# Patient Record
Sex: MALE | Race: WHITE | NOT HISPANIC OR LATINO | Employment: FULL TIME | URBAN - NONMETROPOLITAN AREA
[De-identification: names, ages, dates, MRNs, and addresses within clinical notes are randomized per-mention and may not be internally consistent; named-entity substitution may affect disease eponyms.]

---

## 2018-11-04 ENCOUNTER — HOSPITAL ENCOUNTER (EMERGENCY)
Facility: HOSPITAL | Age: 48
Discharge: HOME | End: 2018-11-04
Attending: FAMILY MEDICINE | Admitting: FAMILY MEDICINE

## 2018-11-04 VITALS
HEART RATE: 83 BPM | WEIGHT: 252 LBS | HEIGHT: 68 IN | SYSTOLIC BLOOD PRESSURE: 182 MMHG | RESPIRATION RATE: 16 BRPM | OXYGEN SATURATION: 96 % | TEMPERATURE: 98.9 F | BODY MASS INDEX: 38.19 KG/M2 | DIASTOLIC BLOOD PRESSURE: 115 MMHG

## 2018-11-04 DIAGNOSIS — K08.89 DENTALGIA: Primary | ICD-10-CM

## 2018-11-04 PROCEDURE — 99283 EMERGENCY DEPT VISIT LOW MDM: CPT

## 2018-11-04 RX ORDER — AMOXICILLIN AND CLAVULANATE POTASSIUM 875; 125 MG/1; MG/1
1 TABLET, FILM COATED ORAL EVERY 12 HOURS SCHEDULED
Qty: 14 TABLET | Refills: 0 | Status: SHIPPED | OUTPATIENT
Start: 2018-11-04 | End: 2018-11-11

## 2018-11-04 RX ORDER — LIDOCAINE HYDROCHLORIDE 10 MG/ML
2 INJECTION, SOLUTION EPIDURAL; INFILTRATION; INTRACAUDAL; PERINEURAL ONCE
Status: COMPLETED | OUTPATIENT
Start: 2018-11-04 | End: 2018-11-04

## 2018-11-04 RX ORDER — BUPIVACAINE HYDROCHLORIDE 5 MG/ML
1.8 INJECTION, SOLUTION EPIDURAL; INTRACAUDAL ONCE
Status: COMPLETED | OUTPATIENT
Start: 2018-11-04 | End: 2018-11-04

## 2018-11-04 RX ORDER — HYDROCODONE BITARTRATE AND ACETAMINOPHEN 7.5; 325 MG/1; MG/1
1 TABLET ORAL EVERY 4 HOURS PRN
Qty: 12 TABLET | Refills: 0 | Status: SHIPPED | OUTPATIENT
Start: 2018-11-04

## 2018-11-04 RX ORDER — IBUPROFEN 800 MG/1
800 TABLET ORAL
Qty: 21 TABLET | Refills: 0 | Status: SHIPPED | OUTPATIENT
Start: 2018-11-04 | End: 2018-11-11

## 2018-11-04 RX ADMIN — LIDOCAINE HYDROCHLORIDE 5 ML: 20 SOLUTION ORAL; TOPICAL at 02:48

## 2018-11-04 RX ADMIN — LIDOCAINE HYDROCHLORIDE 2 ML: 10 INJECTION, SOLUTION EPIDURAL; INFILTRATION; INTRACAUDAL; PERINEURAL at 02:48

## 2018-11-04 RX ADMIN — BUPIVACAINE HYDROCHLORIDE 1.8 ML: 5 INJECTION, SOLUTION EPIDURAL; INTRACAUDAL; PERINEURAL at 02:48

## 2018-11-04 NOTE — ED PROVIDER NOTES
Subjective   48-year-old white male presents to emergency department with a complaint of dental pain.  He states that he was seen by a dentist several days ago but he was not having swelling and significant pain at that time.  He was started on Pen-Vee K and they recommended a root canal or to have his tooth pulled.  However he elected to not do so.  Fact that he was managing his pain well and he was not swollen.  However, in the last 24 hours that is changed and his pain has increased dramatically.  He is also having fair amount of swelling despite the Pen-Vee K.  He states that he needs something to manage the pain until he can see a dentist tomorrow.  He denies any fever.            Review of Systems   Constitutional: Negative.    HENT: Positive for dental problem.    Respiratory: Negative.    Cardiovascular: Negative.        History reviewed. No pertinent past medical history.    No Known Allergies    History reviewed. No pertinent surgical history.    History reviewed. No pertinent family history.    Social History     Socioeconomic History   • Marital status:      Spouse name: Not on file   • Number of children: Not on file   • Years of education: Not on file   • Highest education level: Not on file           Objective   Physical Exam   HENT:   Mouth/Throat:       The red spot on the diagram is the location of his pain. There is no abscess.   There is some facial swelling affecting the lower portion of his face and lips on the right side.       Dental Procedure  Date/Time: 11/4/2018 2:48 AM  Performed by: KHUSHBU PEREZ  Authorized by: KHUSHBU PEREZ     Consent:     Consent obtained:  Verbal    Consent given by:  Patient    Risks discussed:  Nerve damage, swelling, unsuccessful block, intravascular injection and hematoma    Alternatives discussed:  Alternative treatment and no treatment  Indications:     Indications: dental pain    Location:     Block type:  Inferior alveolar     Laterality:  Right  Procedure details (see MAR for exact dosages):     Topical anesthetic:  Lidocaine gel    Syringe type:  Luer lock syringe    Needle gauge:  25 G    Anesthetic injected:  Bupivacaine 0.25% WITH epi and lidocaine 1% w/o epi    Injection procedure:  Anatomic landmarks identified, anatomic landmarks palpated, introduced needle, negative aspiration for blood and incremental injection  Post-procedure details:     Outcome:  Pain relieved    Patient tolerance of procedure:  Tolerated well, no immediate complications               ED Course  ED Course as of Jan 01 0248   Sun Nov 04, 2018   0246 Patient was placed in room one and evaluated by me.  Of note, he presented during downtime of the EMR which resulted in delays of timely medical care for him.  An inferior alveolar nerve block was performed with resolution of his pain.  At this point is stable for discharge and will follow up with his dentist as an outpatient.  His antibiotic was changed to Augmentin and he was given Norco and ibuprofen for pain management.  [CE]      ED Course User Index  [CE] Kedar Rodriguez,           Labs Reviewed - No data to display  No results found.        MDM      Final diagnoses:   Dentalgia            Kedar Rodriguez DO  11/04/18 0249       Kedar Rodriguez DO  01/01/19 0248

## 2019-04-22 ENCOUNTER — HOSPITAL ENCOUNTER (OUTPATIENT)
Dept: GENERAL RADIOLOGY | Facility: HOSPITAL | Age: 49
Discharge: HOME | End: 2019-04-22
Admitting: ORTHOPAEDIC SURGERY

## 2019-04-22 DIAGNOSIS — M25.561 RIGHT KNEE PAIN, UNSPECIFIED CHRONICITY: ICD-10-CM

## 2019-04-22 PROCEDURE — 73562 X-RAY EXAM OF KNEE 3: CPT

## 2019-08-12 ENCOUNTER — OFFICE VISIT (OUTPATIENT)
Dept: PODIATRY | Facility: CLINIC | Age: 49
End: 2019-08-12

## 2019-08-12 VITALS — OXYGEN SATURATION: 98 % | BODY MASS INDEX: 38.19 KG/M2 | HEART RATE: 82 BPM | WEIGHT: 252 LBS | HEIGHT: 68 IN

## 2019-08-12 DIAGNOSIS — M79.671 RIGHT FOOT PAIN: Primary | ICD-10-CM

## 2019-08-12 DIAGNOSIS — M72.2 PLANTAR FASCIITIS: ICD-10-CM

## 2019-08-12 PROCEDURE — 99204 OFFICE O/P NEW MOD 45 MIN: CPT | Performed by: PODIATRIST

## 2019-08-12 PROCEDURE — 20550 NJX 1 TENDON SHEATH/LIGAMENT: CPT | Performed by: PODIATRIST

## 2019-08-12 RX ORDER — LISINOPRIL 10 MG/1
TABLET ORAL
COMMUNITY
Start: 2019-05-02

## 2019-08-12 RX ORDER — MELOXICAM 15 MG/1
15 TABLET ORAL DAILY
Qty: 30 TABLET | Refills: 1 | Status: SHIPPED | OUTPATIENT
Start: 2019-08-12

## 2019-08-12 RX ADMIN — BUPIVACAINE HYDROCHLORIDE 1 ML: 5 INJECTION, SOLUTION EPIDURAL; INTRACAUDAL at 14:40

## 2019-08-12 RX ADMIN — DEXAMETHASONE SODIUM PHOSPHATE 2 MG: 4 INJECTION, SOLUTION INTRA-ARTICULAR; INTRALESIONAL; INTRAMUSCULAR; INTRAVENOUS; SOFT TISSUE at 14:40

## 2019-08-12 RX ADMIN — TRIAMCINOLONE ACETONIDE 10 MG: 40 INJECTION, SUSPENSION INTRA-ARTICULAR; INTRAMUSCULAR at 14:40

## 2019-08-12 NOTE — PROGRESS NOTES
Jas Fernandez  1970  49 y.o. male     Patient came to clinic with right heel pain 8/12/19 08/12/2019    Chief Complaint   Patient presents with   • Right Foot - Pain       History of Present Illness    Jas Fernandez is a 49 y.o.male who presents to clinic with chief complaint of right foot pain.  Pain is located to the heel.  Pain has been present off-and-on for the last year and a half.  There are no associated injuries.  Over the last 3 months it has progressively gotten worse.  He rates the pain as a 8 out of 10.  Pain is worse with the first up in the morning or after periods of rest.  Pain is relieved with rest.  He has no other complaints.      Past Medical History:   Diagnosis Date   • High blood pressure    • Plantar fasciitis          Past Surgical History:   Procedure Laterality Date   • KNEE SURGERY Right          Family History   Problem Relation Age of Onset   • Cancer Mother    • Diabetes Father    • Cancer Father        No Known Allergies    Social History     Socioeconomic History   • Marital status:      Spouse name: Not on file   • Number of children: Not on file   • Years of education: Not on file   • Highest education level: Not on file         Current Outpatient Medications   Medication Sig Dispense Refill   • lisinopril (PRINIVIL,ZESTRIL) 10 MG tablet lisinopril 10 mg tablet   Take 1 tablet every day by oral route.     • HYDROcodone-acetaminophen (NORCO) 7.5-325 MG per tablet Take 1 tablet by mouth Every 4 (Four) Hours As Needed for Moderate Pain  or Severe Pain  for up to 12 doses. 12 tablet 0   • meloxicam (MOBIC) 15 MG tablet Take 1 tablet by mouth Daily. 30 tablet 1     No current facility-administered medications for this visit.        Review of Systems   Constitutional: Negative.    HENT: Negative.    Eyes: Negative.    Respiratory: Negative.    Cardiovascular: Negative.    Gastrointestinal: Negative.    Endocrine: Negative.    Genitourinary: Negative.   "  Musculoskeletal:        Right foot pain    Allergic/Immunologic: Negative.    Neurological: Negative.    Hematological: Negative.    Psychiatric/Behavioral: Negative.          OBJECTIVE    Pulse 82   Ht 172.7 cm (68\")   Wt 114 kg (252 lb)   SpO2 98%   BMI 38.32 kg/m²       Physical Exam   Constitutional: He is oriented to person, place, and time. He appears well-developed and well-nourished. No distress.   HENT:   Head: Normocephalic and atraumatic.   Nose: Nose normal.   Eyes: Conjunctivae and EOM are normal. Pupils are equal, round, and reactive to light.   Neck: Normal range of motion. No JVD present.   Pulmonary/Chest: Effort normal. No respiratory distress. He has no wheezes.   Musculoskeletal: Normal range of motion. He exhibits no edema or deformity.   Neurological: He is alert and oriented to person, place, and time. He displays normal reflexes.   Skin: Skin is warm and dry. Capillary refill takes less than 2 seconds.   Psychiatric: He has a normal mood and affect. His behavior is normal. Thought content normal.   Vitals reviewed.      Gait: normal     Assistive Device: none     Lower Extremity    Cardiovascular:    DP/PT pulses palpable bilateral  CFT brisk  to all digits  Skin temp is warm to warm from proximal tibia to distal digits bilateral  Pedal hair growth present.   No erythema or edema noted     Musculoskeletal:  Muscle strength is 5/5 for all muscle groups tested   ROM of the 1st MTP is WNL bilateral   ROM of the MTJ is WNL bilateral   ROM of the STJ is WNL bilateral   ROM of the ankle joint is  WNL bilateral   Pain on palpation to the medial tubercle right calcaneus.  Negative lateral squeeze test.  No pain on palpation to the posterior calcaneus.    Dermatological:   Skin is warm, dry and intact bilateral  No subcutaneous nodules or masses noted        Procedures    Plantar Fasciits Injection  Date/Time: 8/12/19  Performed by: LIEN MONREAL  Authorized by: LIEN MONREAL   Consent: " Verbal consent obtained. Written consent obtained.  Risks and benefits: risks, benefits and alternatives were discussed  Consent given by: patient  Patient identity confirmed: verbally with patient  Indications: pain relief  Nerve block body site: right  heel.  Sedation:  Patient sedated: no    Patient position: sitting  Needle size: 27 G  Local anesthetic: 0.5% Marcaine plain, Kenalog 40 mg/ml , Decadron 4 mg/mL.   Outcome: pain improved  Patient tolerance: Patient tolerated the procedure well with no immediate complications        ASSESSMENT AND PLAN    Jas was seen today for pain.    Diagnoses and all orders for this visit:    Right foot pain  -     XR Foot 3+ View Right  -     bupivacaine (PF) (MARCAINE) 0.5 % injection 1 mL  -     dexamethasone (DECADRON) injection 2 mg  -     triamcinolone acetonide (KENALOG-40) injection 10 mg    Plantar fasciitis  -     bupivacaine (PF) (MARCAINE) 0.5 % injection 1 mL  -     dexamethasone (DECADRON) injection 2 mg  -     triamcinolone acetonide (KENALOG-40) injection 10 mg    Other orders  -     meloxicam (MOBIC) 15 MG tablet; Take 1 tablet by mouth Daily.      - Comprehensive foot and ankle exam performed  - X-rays taken and reviewed.  No acute osseous abnormalities  -Rx from meloxicam.  - Patient advised to stretch, ice and to make appropriate shoe gear changes to include wearing athletic type shoes with supportive insoles. Patient was given written instructions on how to correctly perform the stretching of the Achilles tendon/calf stretches, and the heel spur/plantar fasciitis regimen. Limit bare foot walking.    - Recommended over-the-counter insole such as power steps, spenco or walk fit  to properly support the arch in order to alleviate the tension and stress on the plantar fascia associated with normal daily walking. Patient was educated on the break-in period for new arch supports.  - Patient is in agreement with the current treatment plan and all questions were  answered.  - RTC in 6weeks           This document has been electronically signed by Steven Paul DPM on August 14, 2019 6:28 AM     8/14/2019  6:28 AM

## 2019-08-14 RX ORDER — DEXAMETHASONE SODIUM PHOSPHATE 4 MG/ML
2 INJECTION, SOLUTION INTRA-ARTICULAR; INTRALESIONAL; INTRAMUSCULAR; INTRAVENOUS; SOFT TISSUE ONCE
Status: COMPLETED | OUTPATIENT
Start: 2019-08-14 | End: 2019-08-12

## 2019-08-14 RX ORDER — BUPIVACAINE HYDROCHLORIDE 5 MG/ML
1 INJECTION, SOLUTION EPIDURAL; INTRACAUDAL ONCE
Status: COMPLETED | OUTPATIENT
Start: 2019-08-14 | End: 2019-08-12

## 2019-08-14 RX ORDER — TRIAMCINOLONE ACETONIDE 40 MG/ML
10 INJECTION, SUSPENSION INTRA-ARTICULAR; INTRAMUSCULAR ONCE
Status: COMPLETED | OUTPATIENT
Start: 2019-08-14 | End: 2019-08-12

## 2020-09-16 ENCOUNTER — OFFICE VISIT (OUTPATIENT)
Dept: PODIATRY | Facility: CLINIC | Age: 50
End: 2020-09-16

## 2020-09-16 VITALS — OXYGEN SATURATION: 99 % | HEART RATE: 75 BPM | WEIGHT: 252 LBS | BODY MASS INDEX: 41.99 KG/M2 | HEIGHT: 65 IN

## 2020-09-16 DIAGNOSIS — M79.671 RIGHT FOOT PAIN: Primary | ICD-10-CM

## 2020-09-16 DIAGNOSIS — M72.2 PLANTAR FASCIITIS: ICD-10-CM

## 2020-09-16 PROCEDURE — 20550 NJX 1 TENDON SHEATH/LIGAMENT: CPT | Performed by: PODIATRIST

## 2020-09-16 PROCEDURE — 99213 OFFICE O/P EST LOW 20 MIN: CPT | Performed by: PODIATRIST

## 2020-09-16 RX ORDER — TRIAMCINOLONE ACETONIDE 40 MG/ML
10 INJECTION, SUSPENSION INTRA-ARTICULAR; INTRAMUSCULAR ONCE
Status: COMPLETED | OUTPATIENT
Start: 2020-09-16 | End: 2020-09-16

## 2020-09-16 RX ORDER — BUPIVACAINE HYDROCHLORIDE 5 MG/ML
5 INJECTION, SOLUTION PERINEURAL ONCE
Status: COMPLETED | OUTPATIENT
Start: 2020-09-16 | End: 2020-09-16

## 2020-09-16 RX ORDER — DEXAMETHASONE SODIUM PHOSPHATE 4 MG/ML
2 INJECTION, SOLUTION INTRA-ARTICULAR; INTRALESIONAL; INTRAMUSCULAR; INTRAVENOUS; SOFT TISSUE ONCE
Status: COMPLETED | OUTPATIENT
Start: 2020-09-16 | End: 2020-09-16

## 2020-09-16 RX ADMIN — TRIAMCINOLONE ACETONIDE 10 MG: 40 INJECTION, SUSPENSION INTRA-ARTICULAR; INTRAMUSCULAR at 11:14

## 2020-09-16 RX ADMIN — DEXAMETHASONE SODIUM PHOSPHATE 2 MG: 4 INJECTION, SOLUTION INTRA-ARTICULAR; INTRALESIONAL; INTRAMUSCULAR; INTRAVENOUS; SOFT TISSUE at 11:15

## 2020-09-16 RX ADMIN — BUPIVACAINE HYDROCHLORIDE 1 ML: 5 INJECTION, SOLUTION PERINEURAL at 11:16

## 2020-09-16 NOTE — PROGRESS NOTES
Jas Fernandez  1970  50 y.o. male     Patient came to clinic with right heel pain states his pain is 4/10 and would like to have a heel injection     09/16/2020     Chief Complaint   Patient presents with   • Right Foot - Follow-up       History of Present Illness    Jas Fernandez is a 50 y.o.male who presents to clinic today with chief complaint of right foot pain.  Pain is located to the bottom of the heel.  He has been present for approximately 3 months.  Progressively getting worse over the last 4 weeks.  Describes the pain as sharp worse with the first up in the morning.  He rates as a 4 out of 10.  He has previously been treated with steroid injections.  States that the last steroid injection lasted approximately 8 months.  He denies any injuries or trauma.  He has no other complaints.    Past Medical History:   Diagnosis Date   • High blood pressure    • Plantar fasciitis          Past Surgical History:   Procedure Laterality Date   • KNEE SURGERY Right          Family History   Problem Relation Age of Onset   • Cancer Mother    • Diabetes Father    • Cancer Father        No Known Allergies    Social History     Socioeconomic History   • Marital status:      Spouse name: Not on file   • Number of children: Not on file   • Years of education: Not on file   • Highest education level: Not on file         Current Outpatient Medications   Medication Sig Dispense Refill   • lisinopril (PRINIVIL,ZESTRIL) 10 MG tablet lisinopril 10 mg tablet   Take 1 tablet every day by oral route.     • HYDROcodone-acetaminophen (NORCO) 7.5-325 MG per tablet Take 1 tablet by mouth Every 4 (Four) Hours As Needed for Moderate Pain  or Severe Pain  for up to 12 doses. 12 tablet 0   • meloxicam (MOBIC) 15 MG tablet Take 1 tablet by mouth Daily. 30 tablet 1     No current facility-administered medications for this visit.        Review of Systems   Constitutional: Negative.    HENT: Negative.    Eyes: Negative.  "   Respiratory: Negative.    Cardiovascular: Negative.    Gastrointestinal: Negative.    Genitourinary: Negative.    Musculoskeletal:        Right foot pain    Skin: Negative.    Neurological: Negative.    Psychiatric/Behavioral: Negative.          OBJECTIVE    Pulse 75   Ht 165.1 cm (65\")   Wt 114 kg (252 lb)   SpO2 99%   BMI 41.93 kg/m²       Physical Exam   Constitutional: He is oriented to person, place, and time. He appears well-developed. No distress.   HENT:   Head: Normocephalic and atraumatic.   Nose: Nose normal.   Neck: Normal range of motion. No JVD present.   Pulmonary/Chest: Effort normal. No respiratory distress. He has no wheezes.   Musculoskeletal: Normal range of motion. Tenderness present. No deformity.   Neurological: He is alert and oriented to person, place, and time. He displays normal reflexes.   Skin: Skin is warm and dry. Capillary refill takes less than 2 seconds.   Psychiatric: His behavior is normal. Thought content normal.   Vitals reviewed.      Gait: normal     Assistive Device: none     Lower Extremity    Cardiovascular:    DP/PT pulses palpable bilateral  CFT brisk  to all digits  Skin temp is warm to warm from proximal tibia to distal digits bilateral  Pedal hair growth present.   No erythema or edema noted   Musculoskeletal:  Muscle strength is 5/5 for all muscle groups tested   ROM of the 1st MTP is WNL bilateral   ROM of the ankle joint is  WNL bilateral   Pain on palpation to the medial tubercle right calcaneus.  Negative lateral squeeze test.  Dermatological:   Skin is warm, dry and intact bilateral  No subcutaneous nodules or masses noted        Procedures    Plantar Fasciits Injection  Date/Time: 09/16/2020   Performed by: LIEN MONREAL  Authorized by: LIEN MONREAL   Consent: Verbal consent obtained. Written consent obtained.  Risks and benefits: risks, benefits and alternatives were discussed  Consent given by: patient  Patient identity confirmed: verbally with " patient  Indications: pain relief  Nerve block body site: right  heel.  Sedation:  Patient sedated: no    Patient position: sitting  Needle size: 27 G  Local anesthetic: 0.5% Marcaine plain, Kenalog 40 mg/ml , Decadron 4 mg/mL.   Outcome: pain improved  Patient tolerance: Patient tolerated the procedure well with no immediate complications        ASSESSMENT AND PLAN    Jas was seen today for follow-up.    Diagnoses and all orders for this visit:    Right foot pain  -     bupivacaine (MARCAINE) 0.5 % injection 1 mL  -     dexamethasone (DECADRON) injection 2 mg  -     triamcinolone acetonide (KENALOG-40) injection 10 mg    Plantar fasciitis      -Comprehensive foot and ankle exam performed  -Steroid injection right heel.  -Recommended OTC arch supports i.e. power steps.  Continue stretching.  Icing and anti-inflammatories as needed.  -Patient is in agreement   -RTC as needed          This document has been electronically signed by Steven Paul DPM on September 17, 2020 19:03 CDT     9/17/2020  19:03 CDT

## 2021-05-03 ENCOUNTER — OFFICE VISIT (OUTPATIENT)
Dept: PODIATRY | Facility: CLINIC | Age: 51
End: 2021-05-03

## 2021-05-03 VITALS — HEART RATE: 81 BPM | HEIGHT: 65 IN | OXYGEN SATURATION: 99 % | WEIGHT: 252 LBS | BODY MASS INDEX: 41.99 KG/M2

## 2021-05-03 DIAGNOSIS — M72.2 PLANTAR FASCIITIS: ICD-10-CM

## 2021-05-03 DIAGNOSIS — M79.671 RIGHT FOOT PAIN: Primary | ICD-10-CM

## 2021-05-03 PROCEDURE — 20550 NJX 1 TENDON SHEATH/LIGAMENT: CPT | Performed by: PODIATRIST

## 2021-05-03 RX ORDER — ASPIRIN 81 MG/1
81 TABLET, CHEWABLE ORAL DAILY
COMMUNITY

## 2021-05-03 RX ORDER — BUPIVACAINE HYDROCHLORIDE 5 MG/ML
1 INJECTION, SOLUTION EPIDURAL; INTRACAUDAL ONCE
Status: COMPLETED | OUTPATIENT
Start: 2021-05-03 | End: 2021-05-03

## 2021-05-03 RX ORDER — DEXAMETHASONE SODIUM PHOSPHATE 10 MG/ML
5 INJECTION INTRAMUSCULAR; INTRAVENOUS ONCE
Status: COMPLETED | OUTPATIENT
Start: 2021-05-03 | End: 2021-05-03

## 2021-05-03 RX ORDER — UBIDECARENONE 75 MG
50 CAPSULE ORAL DAILY
COMMUNITY

## 2021-05-03 RX ORDER — TRIAMCINOLONE ACETONIDE 40 MG/ML
10 INJECTION, SUSPENSION INTRA-ARTICULAR; INTRAMUSCULAR ONCE
Status: COMPLETED | OUTPATIENT
Start: 2021-05-03 | End: 2021-05-03

## 2021-05-03 RX ADMIN — TRIAMCINOLONE ACETONIDE 10 MG: 40 INJECTION, SUSPENSION INTRA-ARTICULAR; INTRAMUSCULAR at 08:53

## 2021-05-03 RX ADMIN — DEXAMETHASONE SODIUM PHOSPHATE 5 MG: 10 INJECTION INTRAMUSCULAR; INTRAVENOUS at 08:53

## 2021-05-03 RX ADMIN — BUPIVACAINE HYDROCHLORIDE 1 ML: 5 INJECTION, SOLUTION EPIDURAL; INTRACAUDAL at 08:53

## 2021-05-03 NOTE — PROGRESS NOTES
Jas Fernandez  1970  50 y.o. male     05/03/2021     Chief Complaint   Patient presents with   • Right Foot - Pain   • Plantar Fasciitis       History of Present Illness    Jas Fernandez is a 50 y.o.male who presents to clinic today with recurrent right heel pain.        Past Medical History:   Diagnosis Date   • High blood pressure    • Plantar fasciitis          Past Surgical History:   Procedure Laterality Date   • KNEE SURGERY Right          Family History   Problem Relation Age of Onset   • Cancer Mother    • Diabetes Father    • Cancer Father        No Known Allergies    Social History     Socioeconomic History   • Marital status:      Spouse name: Not on file   • Number of children: Not on file   • Years of education: Not on file   • Highest education level: Not on file   Tobacco Use   • Smoking status: Never Smoker   • Smokeless tobacco: Never Used   Vaping Use   • Vaping Use: Never used   Substance and Sexual Activity   • Alcohol use: Not Currently   • Drug use: Defer   • Sexual activity: Defer         Current Outpatient Medications   Medication Sig Dispense Refill   • aspirin 81 MG chewable tablet Chew 81 mg Daily.     • lisinopril (PRINIVIL,ZESTRIL) 10 MG tablet lisinopril 10 mg tablet   Take 1 tablet every day by oral route.     • vitamin B-12 (CYANOCOBALAMIN) 100 MCG tablet Take 50 mcg by mouth Daily.     • HYDROcodone-acetaminophen (NORCO) 7.5-325 MG per tablet Take 1 tablet by mouth Every 4 (Four) Hours As Needed for Moderate Pain  or Severe Pain  for up to 12 doses. 12 tablet 0   • meloxicam (MOBIC) 15 MG tablet Take 1 tablet by mouth Daily. 30 tablet 1     No current facility-administered medications for this visit.       Review of Systems   Constitutional: Negative.    HENT: Negative.    Respiratory: Negative.    Cardiovascular: Negative.    Gastrointestinal: Negative.    Musculoskeletal:        Right foot pain    Skin: Negative.    Neurological: Negative.   "  Psychiatric/Behavioral: Negative.          OBJECTIVE    Pulse 81   Ht 165.1 cm (65\")   Wt 114 kg (252 lb)   SpO2 99%   BMI 41.93 kg/m²       Physical Exam   Constitutional: He is oriented to person, place, and time. He appears well-developed. No distress.   HENT:   Head: Normocephalic and atraumatic.   Nose: Nose normal.   Neck: No JVD present.   Pulmonary/Chest: Effort normal. No respiratory distress. He has no wheezes.   Musculoskeletal: Normal range of motion. Tenderness present. No deformity.   Neurological: He is alert and oriented to person, place, and time. He displays normal reflexes.   Skin: Skin is warm and dry. Capillary refill takes less than 2 seconds.   Psychiatric: His behavior is normal. Thought content normal.   Vitals reviewed.      Gait: normal     Assistive Device: none     Lower Extremity    Cardiovascular:    DP/PT pulses palpable bilateral  CFT brisk  to all digits  Skin temp is warm to warm from proximal tibia to distal digits bilateral  Pedal hair growth present.   No erythema or edema noted   Musculoskeletal:  Muscle strength is 5/5 for all muscle groups tested   ROM of the 1st MTP is WNL bilateral   ROM of the ankle joint is  WNL bilateral   Pain on palpation to the medial tubercle right calcaneus.  Negative lateral squeeze test.  Dermatological:   Skin is warm, dry and intact bilateral  No subcutaneous nodules or masses noted        Procedures    Plantar Fasciits Injection  Date/Time: 05/03/2021   Performed by: LIEN MONREAL  Authorized by: LIEN MONREAL   Consent: Verbal consent obtained. Written consent obtained.  Risks and benefits: risks, benefits and alternatives were discussed  Consent given by: patient  Patient identity confirmed: verbally with patient  Indications: pain relief  Nerve block body site: right  heel.  Sedation:  Patient sedated: no    Patient position: sitting  Needle size: 27 G  Local anesthetic: 0.5% Marcaine plain, Kenalog 40 mg/ml , Decadron 4 mg/mL. "   Outcome: pain improved  Patient tolerance: Patient tolerated the procedure well with no immediate complications        ASSESSMENT AND PLAN    Diagnoses and all orders for this visit:    1. Right foot pain (Primary)  -     bupivacaine (PF) (MARCAINE) 0.5 % injection 1 mL  -     dexamethasone (DECADRON) injection 5 mg  -     triamcinolone acetonide (KENALOG-40) injection 10 mg    2. Plantar fasciitis  -     bupivacaine (PF) (MARCAINE) 0.5 % injection 1 mL  -     dexamethasone (DECADRON) injection 5 mg  -     triamcinolone acetonide (KENALOG-40) injection 10 mg      -Recurrent right heel pain.  -Repeat steroid injection.  See procedure note above  -Discussed treatment options going forward including plantar fasciotomy.  -All his questions were answered  -Recheck as needed          This document has been electronically signed by Steven Paul DPM on May 3, 2021 08:58 CDT     5/3/2021  08:58 CDT